# Patient Record
Sex: FEMALE | Race: WHITE | NOT HISPANIC OR LATINO | Employment: FULL TIME | URBAN - METROPOLITAN AREA
[De-identification: names, ages, dates, MRNs, and addresses within clinical notes are randomized per-mention and may not be internally consistent; named-entity substitution may affect disease eponyms.]

---

## 2017-01-04 ENCOUNTER — GENERIC CONVERSION - ENCOUNTER (OUTPATIENT)
Dept: OTHER | Facility: OTHER | Age: 47
End: 2017-01-04

## 2017-01-25 ENCOUNTER — ALLSCRIPTS OFFICE VISIT (OUTPATIENT)
Dept: OTHER | Facility: OTHER | Age: 47
End: 2017-01-25

## 2017-06-01 ENCOUNTER — ALLSCRIPTS OFFICE VISIT (OUTPATIENT)
Dept: OTHER | Facility: OTHER | Age: 47
End: 2017-06-01

## 2017-06-01 ENCOUNTER — TRANSCRIBE ORDERS (OUTPATIENT)
Dept: ADMINISTRATIVE | Facility: HOSPITAL | Age: 47
End: 2017-06-01

## 2017-06-01 DIAGNOSIS — G44.219 EPISODIC TENSION-TYPE HEADACHE, NOT INTRACTABLE: Primary | ICD-10-CM

## 2017-06-01 DIAGNOSIS — M54.12 RADICULOPATHY OF CERVICAL REGION: ICD-10-CM

## 2017-06-01 DIAGNOSIS — G44.219 EPISODIC TENSION-TYPE HEADACHE, NOT INTRACTABLE: ICD-10-CM

## 2017-06-09 ENCOUNTER — HOSPITAL ENCOUNTER (OUTPATIENT)
Dept: RADIOLOGY | Facility: HOSPITAL | Age: 47
Discharge: HOME | End: 2017-06-09
Payer: COMMERCIAL

## 2017-06-09 ENCOUNTER — TRANSCRIBE ORDERS (OUTPATIENT)
Dept: ADMINISTRATIVE | Facility: HOSPITAL | Age: 47
End: 2017-06-09

## 2017-06-09 ENCOUNTER — HOSPITAL ENCOUNTER (OUTPATIENT)
Dept: CT IMAGING | Facility: HOSPITAL | Age: 47
Discharge: HOME | End: 2017-06-09
Payer: COMMERCIAL

## 2017-06-09 DIAGNOSIS — M54.12 RADICULOPATHY OF CERVICAL REGION: ICD-10-CM

## 2017-06-09 DIAGNOSIS — G44.219 EPISODIC TENSION-TYPE HEADACHE, NOT INTRACTABLE: ICD-10-CM

## 2017-06-09 PROCEDURE — 70450 CT HEAD/BRAIN W/O DYE: CPT

## 2017-06-09 PROCEDURE — 72050 X-RAY EXAM NECK SPINE 4/5VWS: CPT

## 2017-06-12 ENCOUNTER — GENERIC CONVERSION - ENCOUNTER (OUTPATIENT)
Dept: OTHER | Facility: OTHER | Age: 47
End: 2017-06-12

## 2017-06-15 ENCOUNTER — GENERIC CONVERSION - ENCOUNTER (OUTPATIENT)
Dept: OTHER | Facility: OTHER | Age: 47
End: 2017-06-15

## 2017-10-01 DIAGNOSIS — E03.9 HYPOTHYROIDISM: ICD-10-CM

## 2017-10-01 DIAGNOSIS — F41.8 OTHER SPECIFIED ANXIETY DISORDERS: ICD-10-CM

## 2018-01-11 NOTE — RESULT NOTES
Verified Results  * CT HEAD WO CONTRAST [id number] 05:51PM Conception Renu Order Number: [id number]    - Patient Instructions: To schedule this appointment, please contact Central Scheduling at [phone]  Test Name Result Flag Reference   CT HEAD WO CONTRAST (Report)     CT BRAIN - WITHOUT CONTRAST     INDICATION: Headache, 4 week     COMPARISON: CT 12/1/2010     TECHNIQUE: CT examination of the brain was performed  In addition to axial images, coronal reformatted images were created and submitted for interpretation  Radiation dose length product (DLP) for this visit: 978 mGy-cm   This examination, like all CT scans performed in the [facility], was performed utilizing techniques to minimize radiation dose exposure, including the use of iterative    reconstruction and automated exposure control  IMAGE QUALITY: Diagnostic  FINDINGS:      PARENCHYMA: No intracranial mass, mass effect or midline shift  No CT signs of acute infarction  There is no parenchymal hemorrhage  VENTRICLES AND EXTRA-AXIAL SPACES: Normal for patient's age  VISUALIZED ORBITS AND PARANASAL SINUSES: Unremarkable  CALVARIUM AND EXTRACRANIAL SOFT TISSUES: Nonspecific soft tissue left greater than right external auditory canal        IMPRESSION:     No acute intracranial abnormality  Workstation performed: [id number]     Signed by:    Fannie Stevenson MD   6/9/17

## 2018-01-12 NOTE — MISCELLANEOUS
Message   Date: 15 Aug 2016 8:20 AM EST, Recorded By: Bib Pineda For: Ankit Ayala: Joon Soto, Self   Phone: [phone] (Home), [phone] (Work)   Reason: Medical Complaint   Patient stated that 1 week ago she has been having a pain in between her breasts that has been getting worse and now its wrapping around to her back  Her stomach is tender to touch also  I advised to go to ER to be further evaluated  Patient agreed and will be going to [place]  Active Problems    1  Acute maxillary sinusitis, recurrence not specified (461 0) (J01 00)   2  Acute sinusitis (461 9) (J01 90)   3  Encounter to establish care (V65 8) (Z76 89)   4  Hypothyroidism (244 9) (E03 9)   5  Insomnia (780 52) (G47 00)    Current Meds   1  Amoxicillin 875 MG Oral Tablet; 1 tab PO BID; Therapy: 19UKW2832 to (Evaluate:76Xor8677)  Requested for: 08GYD1538; Last   Rx:74Tha5713 Ordered   2  Amoxicillin-Pot Clavulanate 875-125 MG Oral Tablet; take 1 tab po BID x 7 days; Therapy: 52YKU9373 to (Last Rx:22Mar2016)  Requested for: 22Mar2016 Ordered   3  Levothyroxine Sodium 88 MCG Oral Tablet; TAKE 1 TABLET DAILY; Therapy: 36PVX1787 to (Evaluate:57Xas4577)  Requested for: 41QXH0682; Last   Rx:22Jun2016 Ordered   4  LORazepam 1 MG Oral Tablet; TAKE 1 TABLET Daily PRN at bedtime; Therapy: (Recorded:80Wnj7667) to Recorded   5  Pantoprazole Sodium 40 MG Oral Tablet Delayed Release; Take 1 tablet twice daily; Therapy: 53UTZ2240 to (Evaluate:00Tfi6899)  Requested for: 46MFI3588; Last   Rx:01Qke3843 Ordered    Allergies    1  No Known Drug Allergies    Signatures   Electronically signed by : Faith Oakley, ; Aug 15 2016  8:23AM EST                       (Author)    Electronically signed by :  Bj Castillo, ; Aug 15 2016  2:09PM EST                       (Author)

## 2018-01-14 VITALS
SYSTOLIC BLOOD PRESSURE: 110 MMHG | HEART RATE: 56 BPM | RESPIRATION RATE: 16 BRPM | HEIGHT: 67 IN | TEMPERATURE: 97.7 F | WEIGHT: 203.4 LBS | BODY MASS INDEX: 31.92 KG/M2 | DIASTOLIC BLOOD PRESSURE: 82 MMHG

## 2018-01-14 VITALS
BODY MASS INDEX: 31.68 KG/M2 | WEIGHT: 202.25 LBS | SYSTOLIC BLOOD PRESSURE: 122 MMHG | DIASTOLIC BLOOD PRESSURE: 80 MMHG

## 2018-01-14 NOTE — RESULT NOTES
Verified Results  * XR SPINE CERVICAL COMPLETE 4 OR 5 VW NON INJURY 09Jun2017 05:51PM Suzan Feliz Order Number: [id number]     Test Name Result Flag Reference   XR SPINE CERVICAL COMPLETE 4 OR 5 VW (Report)     CERVICAL SPINE     INDICATION: Cervical radiculopathy  COMPARISON: None     VIEWS: 5     IMAGES: 6     FINDINGS:     There is mild reversal of the normal cervical lordosis  There is minimal grade 1 retrolisthesis of C5 on C6  There is intervertebral disc space narrowing at C5/C6  There are small anterior vertebral body osteophytes at C5 and C6  There is moderate left neural foraminal narrowing at C3/C4  The prevertebral soft tissues are within normal limits  The lung apices are intact  IMPRESSION:     Multilevel spondylitic degenerative changes of the cervical spine as described above with no evidence of fracture or subluxation  If symptoms of radiculopathy persists, an MRI of the cervical spine could be performed for further evaluation            Workstation performed: [id number]     Signed by:   Liberty Alaniz MD   6/13/17

## 2018-01-15 NOTE — PROGRESS NOTES
Assessment    1  Acute sinusitis (461 9) (J01 90)    Plan  Acute sinusitis    · Amoxicillin-Pot Clavulanate 875-125 MG Oral Tablet (Augmentin); take 1 tab po BID  x 7 days   · Apply warm moist compresses to the affected area 3 times a day for 5 minutes ;  Status:Complete;   Done: 32XRV1612   · Drink at least 6 glasses of water or juice a day ; Status:Complete;   Done: 33OTL7640   · How to use a nasal spray ; Status:Complete;   Done: 55LVT0004   · Taking a hot steamy shower may help your condition ; Status:Complete;   Done:  73OVO0771   · Follow Up if Not Better Evaluation and Treatment  Follow-up  Status: Complete  Done:  99CJX6093    Discussion/Summary    Acute sinusitis which is likely viral at this point considering the time frame of the symptoms  At this time I have advised supportive treatment w/ rest, plenty of fluids, Tylenol or Motrin as needed, Flonase nasal spray, and an OTC decongestant  I will be calling in Augmentin, only to be started if symptoms persist or worsen past 1 week  Should follow up w/ PCP for re-check in 3-5 days  Patient verbalizes understanding and agrees w/ treatment plan  Possible side effects of new medications were reviewed with the patient/guardian today  The treatment plan was reviewed with the patient/guardian  The patient/guardian understands and agrees with the treatment plan     Follow Up with your Primary Care Provider in 3-5 days  If your symptoms worsen follow up at the nearest [place] [address] Emergency Room  If your symptoms worsen follow up at the nearest Emergency Room  Chief Complaint  cold sx      History of Present Illness  42 yo F c/o sinus pain and pressure, teeth pain, nasal congestion, and rhinorrhea x 3 days  No fever/chills  No cough or sore throat  No chest pain, SOB, or wheezing  No GI sx  No skin rashes  Does not smoke  No recent travel  Has had sick contacts w/ similar symptoms  Tried OTC Mucinex w/ minimal relief        Review of Systems    Constitutional: No fever, no chills, feels well, no tiredness, no recent weight gain or loss  ENT: as noted in HPI  Cardiovascular: no complaints of slow or fast heart rate, no chest pain, no palpitations, no leg claudication or lower extremity edema  Respiratory: no complaints of shortness of breath, no wheezing, no dyspnea on exertion, no orthopnea or PND  Gastrointestinal: no complaints of abdominal pain, no constipation, no nausea or diarrhea, no vomiting, no bloody stools  Genitourinary: no complaints of dysuria, no incontinence, no pelvic pain, no dysmenorrhea, no vaginal discharge or abnormal vaginal bleeding  Musculoskeletal: no complaints of arthralgia, no myalgia, no joint swelling or stiffness, no limb pain or swelling  Integumentary: no complaints of skin rash or lesion, no itching or dry skin, no skin wounds  Neurological: no complaints of headache, no confusion, no numbness or tingling, no dizziness or fainting  Active Problems    1  Acute maxillary sinusitis, recurrence not specified (461 0) (J01 00)   2  Acute sinusitis (461 9) (J01 90)   3  Encounter to establish care (V65 8) (Z71 89)   4  Hypothyroidism (244 9) (E03 9)   5  Insomnia (780 52) (G47 00)    Past Medical History    1  Acute upper respiratory infection (465 9) (J06 9)   2  History of No pertinent past surgical history   3  History of Vitamin D deficiency (268 9) (E55 9)    The active problems and past medical history were reviewed and updated today  Family History    1  Family history of Diabetes Mellitus (V18 0)   2  Family history of Graves' Disease    3  Family history of Anxiety (Symptom)    The family history was reviewed and updated today  Social History    · Being A Social Drinker   · Never A Smoker   · Uses Safety Equipment - Seatbelts  The social history was reviewed and updated today  Surgical History    1   History of Abdominoplasty    The surgical history was reviewed and updated today        Current Meds   1  Amoxicillin 875 MG Oral Tablet; 1 tab PO BID; Therapy: 52ROP9968 to (Evaluate:94Wjl8509)  Requested for: 67HWA8369; Last   Rx:06Pqj5544 Ordered   2  LORazepam 1 MG Oral Tablet; TAKE 1 TABLET Daily PRN at bedtime; Therapy: (Recorded:96Cvb9815) to Recorded   3  Pantoprazole Sodium 40 MG Oral Tablet Delayed Release; 1 PO BID  Requested for:   22HXG5468; Last Rx:72Gha4869 Ordered   4  Synthroid 100 MCG Oral Tablet; take 1 tablet by mouth every day; Therapy: 83EEN7732 to (Evaluate:59Som6643)  Requested for: 38HEQ3799; Last   Rx:92Tpu7528 Ordered    The medication list was reviewed and updated today  Allergies    1  No Known Drug Allergies    Observations Made  Awake, alert, oriented x 3  Appears comfortable  Pleasant personality  Has tenderness w/ palpation of maxillary sinuses        Signatures   Electronically signed by : GIULIA Foote ; Mar 24 2016  5:35PM EST                       (Author)

## 2018-01-17 NOTE — RESULT NOTES
Verified Results  * [facility] 73ZPZ0335 04:17PM Vandana Adame   TW Order Number: [id number]    - Patient Instructions: To schedule this appointment, please contact Central Scheduling at [phone]  Test Name Result Flag Reference   US PELVIS COMPLETE (TRANSABDOMINAL AND TRANSVAGINAL) (Report)     PELVIC ULTRASOUND, COMPLETE     INDICATION: No pelvic pain  Cyst seen on prior CT in August  LMP was 2 1/2 weeks ago      COMPARISON: None  TECHNIQUE:  Transabdominal pelvic ultrasound was performed in sagittal and transverse planes with a curvilinear transducer  Additional transvaginal imaging was performed to better evaluate the endometrium and ovaries  Imaging included volumetric    sweeps as well as traditional still imaging technique  FINDINGS:     UTERUS:   The uterus is anteverted in position, measuring 8 9 x 4 2 x 4 9 cm  Contour and echotexture appear normal      The cervix shows no suspicious abnormality  ENDOMETRIUM:    Normal caliber of 12 mm  Homogenous and normal in appearance  Previous filling defect in 2010 not identified  OVARIES/ADNEXA:   Right ovary: 2 3 x 1 7 x 2 4 cm  No suspicious right ovarian abnormality  1 5 cm cystic focus with uniform crenulated margins and mild surrounding color Doppler flow  Doppler flow within normal limits  Left ovary: 2 3 x 1 7 x 2 4 cm  No suspicious left ovarian abnormality  1 5 cm cystic focus with uniform crenulated margins and mild surrounding color Doppler flow  Doppler flow within normal limits  No suspicious adnexal mass or loculated collections  There is no free fluid  IMPRESSION:      Uterus within normal limits  Both ovaries demonstrating similar appearing findings, both sonographically having features suggesting evolving corpus luteum no suspicious features to either of this atypical bilateral symmetric finding            Workstation performed: [id number] Signed by:   Yair Almazan MD   11/29/16

## 2018-01-30 ENCOUNTER — TELEPHONE (OUTPATIENT)
Dept: FAMILY MEDICINE CLINIC | Facility: CLINIC | Age: 48
End: 2018-01-30

## 2018-01-30 DIAGNOSIS — F41.9 ANXIETY AND DEPRESSION: Primary | ICD-10-CM

## 2018-01-30 DIAGNOSIS — F32.A ANXIETY AND DEPRESSION: Primary | ICD-10-CM

## 2018-01-30 RX ORDER — LORAZEPAM 1 MG/1
1 TABLET ORAL
Qty: 30 TABLET | Refills: 1 | OUTPATIENT
Start: 2018-01-30

## 2018-02-02 PROBLEM — K21.9 GERD (GASTROESOPHAGEAL REFLUX DISEASE): Status: ACTIVE | Noted: 2018-02-02

## 2018-02-02 PROBLEM — E66.811 CLASS 1 OBESITY: Status: ACTIVE | Noted: 2018-02-02

## 2018-02-02 PROBLEM — E66.9 CLASS 1 OBESITY: Status: ACTIVE | Noted: 2018-02-02

## 2018-02-02 PROBLEM — E03.9 HYPOTHYROIDISM: Status: ACTIVE | Noted: 2018-02-02

## 2018-02-21 ENCOUNTER — OFFICE VISIT (OUTPATIENT)
Dept: BARIATRICS | Facility: CLINIC | Age: 48
End: 2018-02-21
Payer: COMMERCIAL

## 2018-02-21 VITALS
WEIGHT: 208.3 LBS | DIASTOLIC BLOOD PRESSURE: 82 MMHG | TEMPERATURE: 98.9 F | HEIGHT: 67 IN | BODY MASS INDEX: 32.69 KG/M2 | RESPIRATION RATE: 16 BRPM | HEART RATE: 60 BPM | SYSTOLIC BLOOD PRESSURE: 112 MMHG

## 2018-02-21 DIAGNOSIS — E78.2 MIXED HYPERLIPIDEMIA: ICD-10-CM

## 2018-02-21 DIAGNOSIS — E66.9 CLASS 1 OBESITY: ICD-10-CM

## 2018-02-21 DIAGNOSIS — R63.5 ABNORMAL WEIGHT GAIN: Primary | ICD-10-CM

## 2018-02-21 DIAGNOSIS — E03.9 ACQUIRED HYPOTHYROIDISM: ICD-10-CM

## 2018-02-21 DIAGNOSIS — R73.01 IFG (IMPAIRED FASTING GLUCOSE): ICD-10-CM

## 2018-02-21 DIAGNOSIS — E55.9 VITAMIN D DEFICIENCY: ICD-10-CM

## 2018-02-21 PROCEDURE — 99204 OFFICE O/P NEW MOD 45 MIN: CPT | Performed by: PHYSICIAN ASSISTANT

## 2018-02-21 RX ORDER — MELATONIN
1000 DAILY
COMMUNITY

## 2018-02-21 RX ORDER — MULTIVITAMIN
1 CAPSULE ORAL DAILY
COMMUNITY

## 2018-02-21 NOTE — PATIENT INSTRUCTIONS
Goals: Food log (ie ) www myfitnesspal com,sparkpeople  com,loseit com,calorieking  com,etc    No sugary beverages  At least 64oz of water daily    Gradually increase physical activity to a goal of 5 days per week for 30 minutes of MODERATE intensity PLUS 2 days per week of FULL BODY resistance training  5-10 servings of fruits and vegetables per day  7996-9068 calories per day, 3755-7605 calories on days of exercise

## 2018-02-21 NOTE — PROGRESS NOTES
Assessment/Plan:    Hypothyroidism  -continue levothyroxine as per prescribing provider    Class 1 obesity  -Discussed options of [facility]-Intensive Lifestyle Intervention Program, Very Low Calorie Diet-VLCD and Conservative Program and the role of weight loss medications   -Initial weight loss goal of 5-10% weight loss for improved health  -Screening labs  -Patient is interested in pursuing [facility] alternate program    Vitamin D deficiency  -28 in October  -recheck with labs as ordered by PCP; recommended the pt check insurance coverage  -continue 1000 IU vitamin D    Follow up in approximately 6 weeks with Non-Surgical Physician/Advanced Practitioner  Goals:  Food log (ie ) www myfitnesspal com,sparkpeople  com,loseit com,calorieking  com,etc    No sugary beverages  At least 64oz of water daily  Gradually increase physical activity to a goal of 5 days per week for 30 minutes of MODERATE intensity PLUS 2 days per week of FULL BODY resistance training  5-10 servings of fruits and vegetables per day  4424-7146 calories per day, 8495-0741 calories on days of exercise     Diagnoses and all orders for this visit:    Abnormal weight gain  Comments:  See plan as discussed under Class 1 obesity  Orders:  -     Lipid panel; Future  -     Cancel: Hemoglobin A1c; Future  -     Cancel: Insulin, fasting; Future  -     Vitamin D 25 hydroxy; Future  -     Hemoglobin A1c; Future  -     Insulin, fasting; Future    Class 1 obesity  -     Lipid panel; Future  -     Cancel: Hemoglobin A1c; Future  -     Cancel: Insulin, fasting; Future  -     Vitamin D 25 hydroxy; Future  -     Hemoglobin A1c; Future  -     Insulin, fasting; Future    Body mass index 33 0-33 9, adult    Acquired hypothyroidism  -     Lipid panel; Future  -     Cancel: Hemoglobin A1c; Future  -     Cancel: Insulin, fasting; Future  -     Vitamin D 25 hydroxy; Future    Vitamin D deficiency  -     Lipid panel; Future  -     Cancel: Hemoglobin A1c;  Future  - Cancel: Insulin, fasting; Future  -     Vitamin D 25 hydroxy; Future    Mixed hyperlipidemia  -     Lipid panel; Future    IFG (impaired fasting glucose)  -     Hemoglobin A1c; Future  -     Insulin, fasting; Future    Other orders  -     Multiple Vitamin (MULTIVITAMIN) capsule; Take 1 capsule by mouth daily  -     Omega-3 Fatty Acids (FISH OIL PO); Take by mouth  -     cholecalciferol (VITAMIN D3) 1,000 units tablet; Take 1,000 Units by mouth daily    Subjective:   Chief Complaint   Patient presents with    Consult     Patient is here today for a MWM consult  Patient ID: Harris Marroquin  is a 56 y o  female with Vitamin D deficiency, hypothyroidism and obesity here to pursue weight managment  HPI  Obesity:  Severity: Mild  Onset: past 18-24 months  Contributors: diet, lack of exercise  Modifiers: Has tried diet and exercise in the past  Associated sx: feels uncomfortable    Goals: improve health, feel more confident and comfortable, weigh 170-175 lbs  Drinks at least 64 oz of water/day  Drinks a cup of coffee with creamer and no calorie sweetener  Has an alcoholic beverage every other week  B: protein shake  L: salad with grilled chicken/lean protein + balsamic vinaigrette   S: string cheese, almonds  D: protein  + vegetable + starch, sometimes not feeling full after dinner meal  S: snacks (pretzels/poppcorn)    The following portions of the patient's history were reviewed and updated as appropriate: allergies, current medications, past family history, past medical history, past social history, past surgical history and problem list     Review of Systems   Constitutional: Negative for chills and fever  HENT: Negative for sore throat  Respiratory: Negative for cough and shortness of breath  Cardiovascular: Negative for chest pain and palpitations  Gastrointestinal: Positive for constipation  Negative for abdominal pain, diarrhea, nausea and vomiting          Denies GERD   Genitourinary: Negative for dysuria  Musculoskeletal: Negative for arthralgias  Skin: Negative for rash  Neurological: Negative for headaches  Psychiatric/Behavioral:        Denies sx of depression     Objective:   Physical Exam   Nursing note and vitals reviewed  Constitutional   General appearance: Abnormal   well developed and obese  Eyes No conjunctival pallor  Ears, Nose, Mouth, and Throat Oral mucosa moist    Pulmonary   Respiratory effort: No increased work of breathing or signs of respiratory distress  Auscultation of lungs: Clear to auscultation, equal breath sounds bilaterally, no wheezes, no rales, no rhonci  Cardiovascular   Auscultation of heart: Normal rate and rhythm, normal S1 and S2, without murmurs  Examination of extremities for edema and/or varicosities: Normal   no edema  Abdomen   Abdomen: Abnormal   The abdomen was obese  Bowel sounds were normal  The abdomen was soft and nontender     Musculoskeletal   Gait and station: Normal     Psychiatric   Orientation to person, place and time: Normal     Affect: appropriate

## 2018-03-26 LAB — HBA1C MFR BLD HPLC: 5.1 %

## 2018-03-28 ENCOUNTER — TELEPHONE (OUTPATIENT)
Dept: BARIATRICS | Facility: CLINIC | Age: 48
End: 2018-03-28

## 2018-03-28 NOTE — TELEPHONE ENCOUNTER
Please review with the patient at her appointment:    Cholesterol was elevated  This should improve with weight loss and lifestyle changes with [facility]  However, would recommend she f/u with her PCP for continued monitoring and management  Her vitamin D has slightly decreased since it was checked last (28--> 26)  Would recommend she take OTC vitamin D 5000 IU x1 month then take 2000 IU daily  This shoul dbe repeated by her PCP in 4-6 months  Otherwise, her labs were within acceptable limits  Labs received and sent to be scanned into EPIC  Thank you!

## 2018-03-28 NOTE — TELEPHONE ENCOUNTER
Please call the patient with results  Please recommend that she follow a low fat, low cholesterol diet, limiting refined carbohydrates like white bread, pasta, rice, and potato chips/pretzels  Increase fruits and vegetables  Increase exercise as tolerated  Thank you!

## 2018-04-20 ENCOUNTER — OFFICE VISIT (OUTPATIENT)
Dept: BARIATRICS | Facility: CLINIC | Age: 48
End: 2018-04-20

## 2018-04-20 VITALS — HEIGHT: 67 IN | BODY MASS INDEX: 32.36 KG/M2 | WEIGHT: 206.2 LBS

## 2018-04-20 DIAGNOSIS — R63.5 ABNORMAL WEIGHT GAIN: ICD-10-CM

## 2018-04-20 PROCEDURE — WMPRO12

## 2018-04-20 PROCEDURE — RECHECK

## 2018-04-20 NOTE — PROGRESS NOTES
Weight Management Medical Nutrition Assessment  Is here for initial RD visit for [facility] program  Current wt: 206 2 lbs  Food recall reveals diet long stretches between meals  Exercising doing a variety of activity  Currently using a meal replacement at breakfast but would like to use ours due to higher protein and lower carbs  She will be in our alternate program     Anthropometric Measurements  Start Weight (lbs): 206 2 lbs  Ideal Body Weight (lbs): 132 5 lbs  Highest wt: 290 lbs  Lowest: 175 lbs  Goal Weight (lbs):170-175 lbs    Weight Loss History  Previous weight loss attempts: Exercise    Food and Nutrition Related History  Wake up: 6:30-7   Bed Time: 10:00    Meal Recall  Breakfast: 8:00 [place] meal replacement (240, 15)  Snack: skip  Lunch:11:30-12: turkey burger OR tuna 1 pack w/thin wrap OR salad w/chicken  Snack:skip  Dinner: 6-7: ~3 oz lean protein, swt pot or 1/2 c quinoa, veggies  Snack:skip    Beverages: water, regular soda, coffee/tea and alcohol  Volume of beverage intake: 5-6 bottles water, occasional soda/alcohol    Weekends: Same  Cravings: peanut butter  Trouble area of day: late afternoon to evening    Frequency of Eating out: 1-2x/wk  Food restrictions: ?lactose can tolerate yogurt/cheese  Cooking: self   Food Shopping: self    Physical Activity Intake  Activity:Aerobics, barre, boxing  Frequency:several times per week  Physical limitations/barriers to exercise: n/a    Estimated Needs  Energy  [place]: [id number]:[id number]     X 1 3 -1000 =1133  [employer] Energy Needs: BMR : 1600  1-2# loss sedentary: 920-1420          Lightly active:   8445-1492   Protein:72-90 gm    (1 2-1 5g/kg IBW)  Fluid: 70 oz   (35mL/kg IBW)    Nutrition Diagnosis  Yes; Overweight/obesity  related to Excess energy intake as evidenced by  BMI more than normative standard for age and sex (obesity-grade I 26-30  9)       Nutrition Intervention  Meal Plan  Breakfast: replacement (add unsweetened almond milk + fruit on exercise)  Snack: food snack  Lunch: replacement (add unsweetened almond milk + fruit on exercise)  Snack: food snack or bar  Dinner: 3 oz lean pro, 1/2 cup carb, veggies, 2 fat  Snack: food snack    Nutrition Education:   [facility] Manual  Calorie controlled menu  Lean protein food choices  Healthy snack options  Food journaling tips      Nutrition Counseling:  Strategies: meal planning, portion sizes, healthy snack choices, hydration, fiber intake, protein intake, exercise, food journal      Monitoring and Evaluation:  Evaluation criteria:  Energy Intake:2668-2529 calories using 2 replacements and 1 bar  Meet protein needs  Maintain adequate hydration  Monitor weekly weight  Meal planning/preparation  Food journal   Portions at mealtimes and snacks  Physical activity     Barriers to learning:none  Readiness to change: Action  Comprehension: very good  Expected Compliance: very good

## 2018-06-04 ENCOUNTER — TELEPHONE (OUTPATIENT)
Dept: FAMILY MEDICINE CLINIC | Facility: CLINIC | Age: 48
End: 2018-06-04

## 2018-06-04 DIAGNOSIS — J34.89 SINUS PRESSURE: Primary | ICD-10-CM

## 2018-06-04 RX ORDER — FLUTICASONE PROPIONATE 50 MCG
1 SPRAY, SUSPENSION (ML) NASAL DAILY
Qty: 16 G | Refills: 0 | Status: SHIPPED | OUTPATIENT
Start: 2018-06-04

## 2018-06-04 RX ORDER — AMOXICILLIN 875 MG/1
875 TABLET, COATED ORAL 2 TIMES DAILY
Qty: 14 TABLET | Refills: 0 | Status: SHIPPED | OUTPATIENT
Start: 2018-06-04 | End: 2018-06-11

## 2018-06-04 NOTE — TELEPHONE ENCOUNTER
Can we get more info?  hwere does suzanne hurt? Any fever? Any change in vision? Any ear pressure? Any nasal d/c? Pain in face?

## 2018-06-04 NOTE — TELEPHONE ENCOUNTER
Patient is complaining of having bad headaches and eye twitching  Not sure if she should be seen or if something can be ordered for her?     [address]

## 2018-06-04 NOTE — TELEPHONE ENCOUNTER
Patient returned call  She has frontal/temporal pain in her head, ear pressure, nasal congestion and facial tenderness  She does not have a fever and does not have any change in vision

## 2018-10-27 RX ORDER — LEVOTHYROXINE SODIUM 88 MCG
TABLET ORAL
Qty: 90 TABLET | Refills: 3 | OUTPATIENT
Start: 2018-10-27

## 2019-03-13 ENCOUNTER — TELEPHONE (OUTPATIENT)
Dept: NEUROLOGY | Facility: CLINIC | Age: 49
End: 2019-03-13

## 2019-04-05 ENCOUNTER — TELEPHONE (OUTPATIENT)
Dept: NEUROLOGY | Facility: CLINIC | Age: 49
End: 2019-04-05

## 2020-03-05 ENCOUNTER — OFFICE VISIT (OUTPATIENT)
Dept: BARIATRICS | Facility: CLINIC | Age: 50
End: 2020-03-05

## 2020-03-05 VITALS — HEIGHT: 67 IN | WEIGHT: 214 LBS | BODY MASS INDEX: 33.59 KG/M2

## 2020-03-05 DIAGNOSIS — R63.5 ABNORMAL WEIGHT GAIN: ICD-10-CM

## 2020-03-05 PROCEDURE — WMDI30

## 2020-03-05 PROCEDURE — RECHECK

## 2020-03-05 NOTE — PROGRESS NOTES
Weight Management Medical Nutrition Assessment  Nelson Jackson is here for is here for meal planning (fran ABEL)  Had completed initial [facility] visit in 2018 but then did not return  Current wt: 214 lbs  Reports using [employer] pal and on average consuming ~6159-1573 calories/day, 75-78 gm carb, 107-114 gm protein  Working with a  2x/wk  & has recently started using peloton bike  Based on recall as well as food journal she may not be consuming enough calories on her cardio days  Recommend she add a mid afternoon snack to assist in increasing calories  Questions answered re: protein shakes, body composition  She will f/u next week for a seca body comp  Anthropometric Measurements  Start Weight (lbs): 214 lbs 3/5/2020  Ideal Body Weight (lbs): 132 5 lbs  Highest wt: 290 lbs  Lowest: 175 lbs  Goal Weight (lbs):170-175 lbs    Weight Loss History  Previous weight loss attempts: Exercise    Food and Nutrition Related History  Wake up: 6:30-7   Bed Time: 10:00    Meal Recall  Breakfast: 9:00 [place] oatmeal (250, 14), premier protein (160, 30)  Snack: skip  Lunch:12:30-1:00: 4--5 oz chicken, greens, cranberries, oil/vinegar  Snack:skip  Dinner: 5:30-6:00: ~4-5 oz salmon/turkey burger,  1 cup swt pot, 1 cup veggies  Snack: built bar (110)    Beverages: water, regular soda, coffee/tea and alcohol  Volume of beverage intake: 5-6 bottles water, occasional soda/alcohol    Weekends: Same  Cravings: peanut butter  Trouble area of day: late afternoon to evening    Frequency of Eating out: 1-2x/wk  Food restrictions: ?lactose can tolerate yogurt/cheese  Cooking: self   Food Shopping: self    Physical Activity Intake  Activity:strength training 2x/wk, every other day peloton  30 minutes  Frequency:several times per week  Physical limitations/barriers to exercise: n/a    Estimated Needs  Energy  [employer] Energy Needs:  BMR : 4132  1-2# loss sedentary: 950-1470          Lightly active:   6710-0624  Protein:72-90 gm (1 2-1 5g/kg IBW)  Fluid: 70 oz   (35mL/kg IBW)    Nutrition Diagnosis  Yes; Overweight/obesity  related to Excess energy intake as evidenced by  BMI more than normative standard for age and sex (obesity-grade I 26-30  9)       Nutrition Intervention  Add ~200 calories on cardio days    Consider adding these calories mid afternoon     Nutrition Education:   Healthy snack options  Food journaling tips      Nutrition Counseling:  Strategies: meal planning, portion sizes, healthy snack choices, hydration, fiber intake, protein intake, exercise, food journal      Monitoring and Evaluation:  Evaluation criteria:  Energy Intake 9537-5063 calories  Meet protein needs  Maintain adequate hydration  Monitor weekly weight  Meal planning/preparation  Food journal   Portions at mealtimes and snacks  Physical activity     Barriers to learning:none  Readiness to change: Action  Comprehension: very good  Expected Compliance: very good

## 2022-08-11 ENCOUNTER — TELEPHONE (OUTPATIENT)
Dept: OTHER | Facility: OTHER | Age: 52
End: 2022-08-11

## 2022-08-18 ENCOUNTER — OFFICE VISIT (OUTPATIENT)
Dept: GASTROENTEROLOGY | Facility: AMBULARY SURGERY CENTER | Age: 52
End: 2022-08-18
Payer: COMMERCIAL

## 2022-08-18 ENCOUNTER — TELEPHONE (OUTPATIENT)
Dept: GASTROENTEROLOGY | Facility: AMBULARY SURGERY CENTER | Age: 52
End: 2022-08-18

## 2022-08-18 VITALS
RESPIRATION RATE: 18 BRPM | SYSTOLIC BLOOD PRESSURE: 140 MMHG | BODY MASS INDEX: 33.52 KG/M2 | HEIGHT: 67 IN | DIASTOLIC BLOOD PRESSURE: 106 MMHG | OXYGEN SATURATION: 98 % | HEART RATE: 66 BPM

## 2022-08-18 DIAGNOSIS — R19.7 DIARRHEA, UNSPECIFIED TYPE: ICD-10-CM

## 2022-08-18 DIAGNOSIS — K21.9 GASTROESOPHAGEAL REFLUX DISEASE WITHOUT ESOPHAGITIS: ICD-10-CM

## 2022-08-18 DIAGNOSIS — R11.0 NAUSEA: ICD-10-CM

## 2022-08-18 DIAGNOSIS — Z12.11 SPECIAL SCREENING FOR MALIGNANT NEOPLASMS, COLON: Primary | ICD-10-CM

## 2022-08-18 PROCEDURE — 99204 OFFICE O/P NEW MOD 45 MIN: CPT | Performed by: PHYSICIAN ASSISTANT

## 2022-08-18 RX ORDER — CHLORAL HYDRATE 500 MG
2 CAPSULE ORAL DAILY
COMMUNITY

## 2022-08-18 RX ORDER — CHLORAL HYDRATE 500 MG
2 CAPSULE ORAL
COMMUNITY

## 2022-08-18 NOTE — TELEPHONE ENCOUNTER
Patient is scheduled for colonoscopy on December 12 , 2022 at [facility]  with Georgia Flores MD  Patient is aware of pre-procedure prep of Miralax/Dulcolax and they will be called the day prior between 2 and 6 pm for time to report for procedure  Pre-procedure prep has been given to the patient  in person  on August 18 , 2022

## 2022-08-18 NOTE — PROGRESS NOTES
[facility] - Outpatient Consultation  [facility] 49 y o  female MRN: [id number]  Encounter: [id number]          ASSESSMENT AND PLAN:      1  Nausea, epigastric discomfort  2  Diarrhea  Symptoms almost completely resolved  Went to the emergency room less than 1 week ago due to upper abdominal discomfort, belching and diarrhea  Was discharged on Pepcid which she took for a few days and then stopped  Reports symptoms have mainly subsided  No NSAID use, smoking or alcohol  Lab work during ER with normal CBC, CMP and lipase  Suspect symptoms secondary to viral gastroenteritis verses gastritis  -due to near complete resolution of symptoms, we will continue to monitor for now     -advised patient that if any recurrence of symptoms occur to reach out to our office where I would recommend 6-8 week course of PPI therapy along with EGD added on to colonoscopy below     -gave patient handout on nonulcerogenic diet to follow in the meantime    -continue to avoid NSAIDs, smoking alcohol  3  Colon cancer screening  No prior colonoscopy  No family history of colon cancer  Reports bowel movements are regular  Recent hemoglobin normal     -schedule colonoscopy     -discussed risks of procedure including bleeding, infection and perforation  -prescription preps are not covered by patient's insurance  Will use MiraLax with Dulcolax  Follow-up after procedure  ______________________________________________________________________    HPI:      [facility] is a 52-year-old female with a history of hypothyroidism who presents to the office for follow-up after ER visit for epigastric pain and belching  Patient went to the emergency room earlier this week for persistent symptoms of indigestion  She had workup at that time including lipase, CBC and CMP which were unremarkable  No imaging was performed  She was discharged with Pepcid    She reports taking this for few days with improvement of her symptoms therefore she stops 2 days ago  She reports her symptoms have now subsided  Occasionally has feeling of an upset stomach but denies any pain  Her belching has improved  No nausea or vomiting  She reports her symptoms started somewhat all of a sudden  She also had significant diarrhea at that time  That has subsided as well  She denies any frequent NSAID use  She was taking a protein shake which she found it was recalled just a few weeks ago    Patient occasionally will have a left lower quadrant discomfort which she describes as a pressure sensation with happens about once a month  She reports her bowel movements are without constipation  She has follow-up with OBGYN in October  No family history of GI malignancies  No prior EGD or colonoscopy  REVIEW OF SYSTEMS:    CONSTITUTIONAL: Denies any fever, chills, rigors, and weight loss  HEENT: No earache or tinnitus  Denies hearing loss or visual disturbances  CARDIOVASCULAR: No chest pain or palpitations  RESPIRATORY: Denies any cough, hemoptysis, shortness of breath or dyspnea on exertion  GASTROINTESTINAL: As noted in the History of Present Illness  GENITOURINARY: No problems with urination  Denies any hematuria or dysuria  NEUROLOGIC: No dizziness or vertigo, denies headaches  MUSCULOSKELETAL: Denies any muscle or joint pain  SKIN: Denies skin rashes or itching  ENDOCRINE: Denies excessive thirst  Denies intolerance to heat or cold  PSYCHOSOCIAL: Denies depression or anxiety  Denies any recent memory loss         Historical Information   Past Medical History:   Diagnosis Date    Adult ADHD     Benign ovarian cyst     Cervical radiculopathy     Disease of thyroid gland     Endometriosis     History of syncope     Hypothyroidism     Insomnia     Low vitamin D level     Pelvic mass in female     Pelvic pain in female     Pregnancy     Situational anxiety     Upper respiratory infection     Vitamin D deficiency      Past Surgical History:   Procedure Laterality Date    ABDOMINOPLASTY      BREAST SURGERY       Social History   Social History     Substance and Sexual Activity   Alcohol Use Yes    Comment: occasionally     Social History     Substance and Sexual Activity   Drug Use No     Social History     Tobacco Use   Smoking Status Never Smoker   Smokeless Tobacco Never Used     Family History   Problem Relation Age of Onset    Aneurysm Mother     Diabetes Mother     Stroke Mother    Doyal Dense' disease Mother     Heart disease Father         cardiac disorder    Anxiety disorder Sister     Hypothyroidism Family     Lung cancer Family     Diabetes Family     Hypertension Neg Hx     Hyperlipidemia Neg Hx        Meds/Allergies       Current Outpatient Medications:     CALCIUM-MAGNESIUM-ZINC PO    fluticasone (FLONASE) 50 mcg/act nasal spray    levothyroxine 88 mcg tablet    Multiple Vitamin (MULTIVITAMIN ADULT PO)    Vitamin D-Vitamin K (VITAMIN K2-VITAMIN D3 PO)    B Complex Vitamins (VITAMIN B COMPLEX PO)    B Complex-C (B COMPLEX-VITAMIN C PO)    cholecalciferol (VITAMIN D3) 1,000 units tablet    LORazepam (ATIVAN) 1 mg tablet    Multiple Vitamin (MULTIVITAMIN) capsule    Multiple Vitamins-Minerals (MULTIVITAL-M PO)    Omega-3 1000 MG CAPS    Omega-3 Fatty Acids (FISH OIL PO)    Omega-3 Fatty Acids (fish oil) 1,000 mg    PROGESTERONE PO    No Known Allergies        Objective     Blood pressure (!) 140/106, pulse 66, resp  rate 18, height 5' 7" (1 702 m), SpO2 98 %  Body mass index is 33 52 kg/m²  PHYSICAL EXAM:      General Appearance:   Alert, cooperative, no distress   HEENT:   Normocephalic, atraumatic, anicteric      Neck:  Supple, symmetrical, trachea midline   Lungs:   Clear to auscultation bilaterally; no rales, rhonchi or wheezing; respirations unlabored    Heart[de-identified]   Regular rate and rhythm; no murmur, rub, or gallop     Abdomen:   + minimal tenderness to palpation in the epigastric area  Abdomen is soft, nondistended  Genitalia:   Deferred    Rectal:   Deferred    Extremities:  No cyanosis, clubbing or edema    Pulses:  2+ and symmetric    Skin:  No jaundice, rashes, or lesions    Lymph nodes:  No palpable cervical lymphadenopathy        Lab Results:   No visits with results within 1 Day(s) from this visit  Latest known visit with results is:   Orders Only on 05/16/2018   Component Date Value    Hemoglobin A1C 03/26/2018 5 1          Radiology Results:   No results found

## 2022-08-18 NOTE — PATIENT INSTRUCTIONS
Scheduled date of colonoscopy (as of today): 12/12/2022  Physician performing colonoscopy:  Dr Edgardo Jackson  Location of colonoscopy:  [facility]  Bowel prep reviewed with patient: Miralax/ Dulcolax  Instructions reviewed with patient by: David Patel  Clearances:  None

## 2022-08-19 NOTE — TELEPHONE ENCOUNTER
Left message for patient to reschedule as I accidentally gave her a wrong date for Dr Chery Head  I left patient my direct line

## 2023-02-07 ENCOUNTER — OFFICE VISIT (OUTPATIENT)
Dept: OBGYN CLINIC | Facility: CLINIC | Age: 53
End: 2023-02-07

## 2023-02-07 VITALS — SYSTOLIC BLOOD PRESSURE: 122 MMHG | DIASTOLIC BLOOD PRESSURE: 78 MMHG | HEIGHT: 66 IN | BODY MASS INDEX: 34.54 KG/M2

## 2023-02-07 DIAGNOSIS — Z12.4 ENCOUNTER FOR SCREENING FOR MALIGNANT NEOPLASM OF CERVIX: ICD-10-CM

## 2023-02-07 DIAGNOSIS — Z12.31 ENCOUNTER FOR SCREENING MAMMOGRAM FOR MALIGNANT NEOPLASM OF BREAST: ICD-10-CM

## 2023-02-07 DIAGNOSIS — Z11.51 SCREENING FOR HPV (HUMAN PAPILLOMAVIRUS): ICD-10-CM

## 2023-02-07 DIAGNOSIS — Z01.419 WELL WOMAN EXAM WITH ROUTINE GYNECOLOGICAL EXAM: Primary | ICD-10-CM

## 2023-02-07 RX ORDER — UBIDECARENONE 50 MG
50 CAPSULE ORAL DAILY
COMMUNITY

## 2023-02-07 NOTE — PROGRESS NOTES
ASSESSMENT & PLAN:   Diagnoses and all orders for this visit:    Well woman exam with routine gynecological exam  -     Liquid-based pap, screening    Encounter for screening mammogram for malignant neoplasm of breast  -     Mammo screening bilateral w 3d & cad; Future    Encounter for screening for malignant neoplasm of cervix  -     Liquid-based pap, screening    Screening for HPV (human papillomavirus)  -     Liquid-based pap, screening    Other orders  -     NON FORMULARY; Nutrifol  -     Coenzyme Q10 50 MG CAPS; Take 50 mg by mouth daily          The following were reviewed in today's visit: ASCCP guidelines, Gardisil vaccination, STD testing breast self exam, exercise and healthy diet  Patient to return to office in yearly for annual exam      All questions have been answered to her satisfaction  CC:  Annual Gynecologic Examination  Chief Complaint   Patient presents with   • Gynecologic Exam     Pt is here for her yearly exam  Pap ordered  Pt did not want to be weighed  Pt would like to talk about hormones and hot flashes and how to help with it  Pt has noticed an odor but denies itching or discharge  HPI: Mehreen Boyer is a 47 y o  [id number] who presents for annual gynecologic examination  She has the following concerns:  Perimenopause  Occasional hot flashes  She is sleeping better with magnesium  She struggles with central weight gain  Health Maintenance:    Exercise: 6 days a week, , peloton  Breast exams/breast awareness: yes  Diet: watching what she's eating  Last mammogram: 2/2023   Colorectal cancer screening: scheduled for March         Past Medical History:   Diagnosis Date   • Adult ADHD    • Benign ovarian cyst    • Cervical radiculopathy    • Disease of thyroid gland    • Endometriosis    • History of syncope    • Hypothyroidism    • Insomnia    • Low vitamin D level    • Pelvic mass in female    • Pelvic pain in female    • Pregnancy    • Situational anxiety    • Upper respiratory infection    • Vitamin D deficiency        Past Surgical History:   Procedure Laterality Date   • ABDOMINOPLASTY     • BREAST SURGERY         Past OB/Gyn History:   Patient's last menstrual period was 05/22/2022 (exact date)  Menopausal status: perimenopausal  Menopausal symptoms: weight gain/hot flashes  Last Pap: per covid   History of abnormal Pap smear: no    Patient is currently sexually active     STD testing: no  Current contraception: none      Family History  Family History   Problem Relation Age of Onset   • Aneurysm Mother    • Diabetes Mother    • Stroke Mother    • Graves' disease Mother    • Heart disease Father         cardiac disorder   • Anxiety disorder Sister    • Hypothyroidism Family    • Lung cancer Family    • Diabetes Family    • Hypertension Neg Hx    • Hyperlipidemia Neg Hx        Family history of uterine or ovarian cancer: no  Family history of breast cancer: no  Family history of colon cancer: no    Social History:  Social History     Socioeconomic History   • Marital status: Single     Spouse name: Not on file   • Number of children: Not on file   • Years of education: Not on file   • Highest education level: Not on file   Occupational History   • Not on file   Tobacco Use   • Smoking status: Never   • Smokeless tobacco: Never   Vaping Use   • Vaping Use: Never used   Substance and Sexual Activity   • Alcohol use: Yes     Comment: occasionally   • Drug use: No   • Sexual activity: Yes     Partners: Male   Other Topics Concern   • Not on file   Social History Narrative    Uses safety equipment-seat belts     Social Determinants of Health     Financial Resource Strain: Not on file   Food Insecurity: Not on file   Transportation Needs: Not on file   Physical Activity: Not on file   Stress: Not on file   Social Connections: Not on file   Intimate Partner Violence: Not on file   Housing Stability: Not on file     Domestic violence screen: negative    Allergies:  No Known Allergies    Medications:    Current Outpatient Medications:   •  Coenzyme Q10 50 MG CAPS, Take 50 mg by mouth daily, Disp: , Rfl:   •  fluticasone (FLONASE) 50 mcg/act nasal spray, 1 spray into each nostril daily, Disp: 16 g, Rfl: 0  •  levothyroxine 88 mcg tablet, Take 1 tablet by mouth daily, Disp: , Rfl:   •  Multiple Vitamin (MULTIVITAMIN ADULT PO), Take 1 tablet by mouth daily, Disp: , Rfl:   •  NON FORMULARY, Nutrifol, Disp: , Rfl:   •  B Complex Vitamins (VITAMIN B COMPLEX PO), Take 1 capsule by mouth (Patient not taking: Reported on 8/18/2022), Disp: , Rfl:   •  B Complex-C (B COMPLEX-VITAMIN C PO), Take 1 capsule by mouth daily (Patient not taking: Reported on 8/18/2022), Disp: , Rfl:   •  CALCIUM-MAGNESIUM-ZINC PO, Take 3 tablets by mouth daily (Patient not taking: Reported on 2/7/2023), Disp: , Rfl:   •  cholecalciferol (VITAMIN D3) 1,000 units tablet, Take 1,000 Units by mouth daily (Patient not taking: Reported on 8/18/2022), Disp: , Rfl:   •  LORazepam (ATIVAN) 1 mg tablet, Take 1 tablet (1 mg total) by mouth daily at bedtime (Patient not taking: Reported on 8/18/2022), Disp: 30 tablet, Rfl: 1  •  Multiple Vitamin (MULTIVITAMIN) capsule, Take 1 capsule by mouth daily (Patient not taking: Reported on 8/18/2022), Disp: , Rfl:   •  Multiple Vitamins-Minerals (MULTIVITAL-M PO), Take by mouth (Patient not taking: Reported on 8/18/2022), Disp: , Rfl:   •  Omega-3 1000 MG CAPS, Take 2 % by mouth (Patient not taking: Reported on 8/18/2022), Disp: , Rfl:   •  Omega-3 Fatty Acids (FISH OIL PO), Take by mouth (Patient not taking: Reported on 8/18/2022), Disp: , Rfl:   •  Omega-3 Fatty Acids (fish oil) 1,000 mg, Take 2 g by mouth daily (Patient not taking: Reported on 8/18/2022), Disp: , Rfl:   •  PROGESTERONE PO, 50 mg (Patient not taking: Reported on 8/18/2022), Disp: , Rfl:   •  Vitamin D-Vitamin K (VITAMIN K2-VITAMIN D3 PO), Take 1 capsule by mouth once (Patient not taking: Reported on 2/7/2023), Disp: , Rfl:     Review of Systems:  Review of Systems   Constitutional: Negative for chills and fever  Respiratory: Negative for cough and shortness of breath  Cardiovascular: Positive for palpitations (occasional)  Negative for chest pain  Gastrointestinal: Positive for abdominal distention (in the evening)  Negative for abdominal pain, blood in stool, constipation, nausea and vomiting  Genitourinary: Negative for difficulty urinating, dyspareunia, dysuria, frequency, pelvic pain, urgency, vaginal bleeding, vaginal discharge and vaginal pain  Neurological: Negative for headaches  Physical Exam:  /78 (BP Location: Left arm, Patient Position: Sitting, Cuff Size: Large)   Ht 5' 6" (1 676 m)   LMP 05/22/2022 (Exact Date)   BMI 34 54 kg/m²    Physical Exam  Constitutional:       General: She is awake  Appearance: Normal appearance  She is well-developed  Genitourinary:      Vulva, bladder and urethral meatus normal       Right Labia: No rash, tenderness or lesions  Left Labia: No tenderness, lesions or rash  No labial fusion noted  No vaginal discharge, erythema, tenderness or bleeding  No vaginal prolapse present  No vaginal atrophy present  Right Adnexa: not tender, not full and no mass present  Left Adnexa: not tender, not full and no mass present  Cervix is parous  No cervical motion tenderness, discharge, lesion or polyp  Uterus is not enlarged, fixed, tender or irregular  No uterine mass detected  Uterus exam comments: 8-10wk size    Uterus is anteverted  No urethral prolapse present  Bladder is not tender  Pelvic exam was performed with patient in the lithotomy position  Breasts:     Right: No inverted nipple, mass, nipple discharge, skin change or tenderness  Left: No inverted nipple, mass, nipple discharge, skin change or tenderness     HENT:      Head: Normocephalic and atraumatic  Cardiovascular:      Rate and Rhythm: Normal rate and regular rhythm  Heart sounds: Normal heart sounds  Pulmonary:      Effort: Pulmonary effort is normal  No tachypnea or respiratory distress  Breath sounds: Normal breath sounds  Abdominal:      General: Abdomen is flat  There is no distension  Palpations: Abdomen is soft  Tenderness: There is no abdominal tenderness  There is no guarding or rebound  Musculoskeletal:      Cervical back: Neck supple  Lymphadenopathy:      Upper Body:      Right upper body: No supraclavicular or axillary adenopathy  Left upper body: No supraclavicular or axillary adenopathy  Neurological:      General: No focal deficit present  Mental Status: She is alert and oriented to person, place, and time  Psychiatric:         Mood and Affect: Mood normal          Behavior: Behavior normal          Thought Content: Thought content normal          Judgment: Judgment normal    Vitals reviewed

## 2023-02-08 LAB
HPV HR 12 DNA CVX QL NAA+PROBE: NEGATIVE
HPV16 DNA CVX QL NAA+PROBE: NEGATIVE
HPV18 DNA CVX QL NAA+PROBE: NEGATIVE

## 2023-02-14 LAB
LAB AP GYN PRIMARY INTERPRETATION: NORMAL
Lab: NORMAL

## 2023-05-25 ENCOUNTER — ANESTHESIA EVENT (OUTPATIENT)
Dept: ANESTHESIOLOGY | Facility: HOSPITAL | Age: 53
End: 2023-05-25

## 2023-05-25 ENCOUNTER — ANESTHESIA (OUTPATIENT)
Dept: ANESTHESIOLOGY | Facility: HOSPITAL | Age: 53
End: 2023-05-25

## 2023-05-25 NOTE — ANESTHESIA PREPROCEDURE EVALUATION
Procedure:  PRE-OP ONLY    Relevant Problems   ENDO   (+) Hypothyroidism      GI/HEPATIC   (+) GERD (gastroesophageal reflux disease)        Physical Exam    Airway    Mallampati score: II  TM Distance: >3 FB  Neck ROM: full     Dental   No notable dental hx     Cardiovascular  Cardiovascular exam normal    Pulmonary  Pulmonary exam normal     Other Findings        Anesthesia Plan  ASA Score- 2     Anesthesia Type- IV sedation with anesthesia with ASA Monitors  Additional Monitors:   Airway Plan:           Plan Factors-Exercise tolerance (METS): >4 METS  Chart reviewed  EKG reviewed  Imaging results reviewed  Existing labs reviewed  Patient summary reviewed  Patient is not a current smoker  Patient not instructed to abstain from smoking on day of procedure  Patient did not smoke on day of surgery  Obstructive sleep apnea risk education given perioperatively  Induction-     Postoperative Plan-     Informed Consent- Anesthetic plan and risks discussed with patient  I personally reviewed this patient with the CRNA  Discussed and agreed on the Anesthesia Plan with the CRNA  Ana Angel

## 2023-09-13 ENCOUNTER — OFFICE VISIT (OUTPATIENT)
Dept: OBGYN CLINIC | Facility: CLINIC | Age: 53
End: 2023-09-13
Payer: COMMERCIAL

## 2023-09-13 DIAGNOSIS — R63.5 WEIGHT GAIN: ICD-10-CM

## 2023-09-13 DIAGNOSIS — G47.9 SLEEP DISTURBANCE: ICD-10-CM

## 2023-09-13 DIAGNOSIS — N95.1 MENOPAUSAL SYMPTOM: Primary | ICD-10-CM

## 2023-09-13 PROCEDURE — 99215 OFFICE O/P EST HI 40 MIN: CPT | Performed by: OBSTETRICS & GYNECOLOGY

## 2023-09-15 NOTE — PROGRESS NOTES
Assessment/Plan:       Diagnoses and all orders for this visit:    Menopausal symptom  -     other medication, see sig,; Medication/product name: \progesterone capsule  Strength: 50  mg  Sig (include dose, route, frequency): take one pill by mouth nightly    Weight gain    Sleep disturbance        1) This was a lengthy visit spent discussing the HPATG (hypothalamus/pituitary/adrenal/thyroid/gonadal) axis and the impact that hormonal deviation in one gland can have on another. It is not uncommon for ovarian declined to coincide or invoke thyroid and adrenal dysfunction as well. 2) She had [place] testing with Dr. Shubham Savage in 5/23 from [address] [facility] [address] labs which we reviewed together, noting estradiol high normal despite no HRT, 3/4 cortisol points reveal cortisol to hug low end of curve. 3) We discussed the premise of adrenal fatigue as documented by labs, which is usually preceded by a time of high cortisol levels, often the cause of progressive midabdominal weight gain. 4) Inflammatory joint pain, hot flash point to more ovarian hormone decline. I discussed the long term health benefits of E2/PG, including: reduction of CVD risk, reduction of hyperlipidemia, reduction of DM and GLP-1 agonist activity with mild appetite suppression; reduction of colon CA, osteoporosis and cognitive decline, Alzheimer's disease. 5) By lab assessment she does not need estradiol yet, but that will likely change within this next year. Recommend start with progesterone, she prefers lower dose in compounded form. Progesterone caps 50 mg SR daily sent to [facility]. 6) I agree with nutritional adjustments to bring down inflammation. I like tart cherry juice, turmeric, in addition to bergamot. Paleo food plan with no gluten or dairy may also help with this as well. 7) Email with follow up progress report in a month or so. Follow up prn if hot flashes worsen for consideration of further additions to hormone therapy.      This was a 48 minute visit with greater than 50% of time spent in face to face counseling and coordination of care. Subjective:      Patient ID: Joey Zuleta is a 47 y.o. female. Naty Welch presents for hormonal consult, her first visit with me; self referred, sees Dr. Delores Saba for gyn care. Naty Welch has been menopausal for a year now. Has seen Dr. Kathy Esquivel for supplementation advice, but can not afford his  model and would like to see what I suggest. She complains of:  1) Weight gain, mid abdominal deposition. Recently told her lipids were elevated, trying bergamot per Dr. Kathy Esquivel to try to get this down. Has tried different food plan approaches without success  2) Hot flashes, that are actually improving over past several months. 3) Poor sleep with premature awakening. Magnesium has been helpful for this  4) Inflammation and joint pain. She recently started Nutrafol for hair growth ( and stopped her previous MVI), Bergamot' also on: ADK, Omega 3 in addition to her levothyroxine. PMXH: hx of hypothyroid as above; s/p DxLS for endometriosis/pelvic pain 10 years ago  SHX: denies tobacco, social ETOH  FHX: Mom COD aneurysm age 62; MGM DM2; MGF stroke, COPD. Dad COD AMI age 79. PGP CVD. 1 sister, s/p partial parathyroidectomy; 1 half brother, healthy. 1 son, 32, healthy. Review of Systems   Constitutional: Positive for unexpected weight change. Negative for activity change, appetite change and fatigue. Endocrine: Positive for heat intolerance. Genitourinary: Negative. Negative for pelvic pain. Musculoskeletal: Positive for arthralgias. Negative for joint swelling. Psychiatric/Behavioral: Positive for sleep disturbance. Objective: There were no vitals taken for this visit. Physical Exam  Constitutional:       Appearance: Normal appearance. Neurological:      Mental Status: She is alert.

## 2024-01-05 NOTE — ASSESSMENT & PLAN NOTE
-29 in October  -recheck with labs as ordered by PCP; recommended the pt check insurance coverage  -continue 1000 IU vitamin D
-Discussed options of [facility]-Intensive Lifestyle Intervention Program, Very Low Calorie Diet-VLCD and Conservative Program and the role of weight loss medications   -Initial weight loss goal of 5-10% weight loss for improved health  -Screening labs  -Patient is interested in pursuing [facility] alternate program
-continue levothyroxine as per prescribing provider
Satisfactory

## 2024-03-29 ENCOUNTER — TELEPHONE (OUTPATIENT)
Age: 54
End: 2024-03-29

## 2024-03-29 NOTE — TELEPHONE ENCOUNTER
Caller: Deyanira Winston    Doctor: Dr. Reyes?     Reason for call: checked chart for date last seen/2017/NP/scheduled/appt was reason for call    Call back#: NA

## 2024-05-24 ENCOUNTER — TELEPHONE (OUTPATIENT)
Dept: OBGYN CLINIC | Facility: CLINIC | Age: 54
End: 2024-05-24

## 2024-05-24 NOTE — TELEPHONE ENCOUNTER
Left patient a vm to offer appointment per [facility].     I scheduled her for 5/30 at 12 virtually     Please conf

## 2024-05-30 ENCOUNTER — TELEMEDICINE (OUTPATIENT)
Dept: OBGYN CLINIC | Facility: CLINIC | Age: 54
End: 2024-05-30
Payer: COMMERCIAL

## 2024-05-30 DIAGNOSIS — R63.5 WEIGHT GAIN: Primary | ICD-10-CM

## 2024-05-30 DIAGNOSIS — E66.09 CLASS 2 OBESITY DUE TO EXCESS CALORIES WITHOUT SERIOUS COMORBIDITY WITH BODY MASS INDEX (BMI) OF 35.0 TO 35.9 IN ADULT: ICD-10-CM

## 2024-05-30 PROCEDURE — 99215 OFFICE O/P EST HI 40 MIN: CPT | Performed by: OBSTETRICS & GYNECOLOGY

## 2024-08-05 DIAGNOSIS — N95.1 MENOPAUSAL SYMPTOMS: Primary | ICD-10-CM

## 2024-11-11 NOTE — PROGRESS NOTES
Patient presented to the office today for annual exam. She declined to be weighed. The MA, Bev Zambrano, roomed the patient and reviewed medications, allergies, and any new changes to her medical history.     MA came out of room to report to me that the patient is ready and disclosed that the patient appears to be anxious. She was questioning why we did not have her medications on file because she is an [facility] patient and believed that our systems were streamlined. She also was questioning why a breast exam is necessary since she recently had a mammogram. The MA reported that she addressed the patient's concerns and reviewed that a breast exam can be declined.     I entered the room and introduced myself as the provider. Patient was in a gown and ready for the appointment. As I introduced myself, the patient reported that she is very uncomfortable and does not wish to continue the appointment. She desires to leave. I inquired if she was okay, and she reiterated that she just desired to leave. I reviewed that she can leave and reviewed, if she needs anything, we are here for her. Reviewed she can call to reschedule or book out at check-out.

## 2024-11-12 ENCOUNTER — ANNUAL EXAM (OUTPATIENT)
Dept: OBGYN CLINIC | Facility: CLINIC | Age: 54
End: 2024-11-12

## 2024-11-12 VITALS — HEIGHT: 66 IN | DIASTOLIC BLOOD PRESSURE: 98 MMHG | BODY MASS INDEX: 36.32 KG/M2 | SYSTOLIC BLOOD PRESSURE: 140 MMHG

## 2024-11-12 DIAGNOSIS — Z01.419 WELL WOMAN EXAM: Primary | ICD-10-CM

## 2024-11-12 RX ORDER — LORAZEPAM 0.5 MG/1
TABLET ORAL
COMMUNITY

## 2024-11-12 RX ORDER — OMEGA-3/DHA/EPA/FISH OIL 60 MG-90MG
2 CAPSULE ORAL DAILY
COMMUNITY

## 2024-11-12 RX ORDER — LEVOTHYROXINE SODIUM 88 UG/1
88 TABLET ORAL DAILY
COMMUNITY

## 2024-11-12 RX ORDER — LEVOTHYROXINE SODIUM 100 MCG
TABLET ORAL
COMMUNITY
Start: 2024-09-20

## 2024-11-12 RX ORDER — LEVOTHYROXINE SODIUM 88 UG/1
TABLET ORAL
COMMUNITY

## 2024-12-17 ENCOUNTER — TELEPHONE (OUTPATIENT)
Age: 54
End: 2024-12-17

## 2024-12-17 NOTE — TELEPHONE ENCOUNTER
Patient called to confirm her appt for Thursday 12/19/2024, patient states it was supposed to be with . I informed patient that the appt is with Dannielle Dent and that  next available is 03/2024. I also added patient to the wait list incase of any cancellation for  within the next few day before patients appt. Please call patient if she can see .    I did try to call patient back to verify the office patient preferred to go to. Patient was seen at [facility]. Left a non detailed message for patient to call back.    Please confirm which office patient is  looking to go to.

## 2025-01-29 ENCOUNTER — TELEPHONE (OUTPATIENT)
Dept: OTHER | Facility: OTHER | Age: 55
End: 2025-01-29

## 2025-01-29 NOTE — TELEPHONE ENCOUNTER
Patient is calling regarding cancelling an appointment.    Date/Time: 01/30 @230    Patient was rescheduled: YES [] NO [x]    Patient requesting call back to reschedule: YES [x] NO []